# Patient Record
(demographics unavailable — no encounter records)

---

## 2024-10-21 NOTE — END OF VISIT
[FreeTextEntry3] : I, Walker Nunez solely acted as scribe for Dr. Radha Shah on 10/15/2024  All medical entries made by the Scribe were at my, Dr. Jones, direction and personally dictated by me on 10/15/2024 . I have reviewed the chart and agree that the record accurately reflects my personal performance of the history, physical exam, assessment and plan. I have also personally directed, reviewed, and agreed with the chart.

## 2024-10-21 NOTE — HISTORY OF PRESENT ILLNESS
[LMP unknown] : LMP unknown [IUD] : has an intrauterine device [Y] : Positive pregnancy history [unknown] : Patient is unsure of the date of her LMP [Menarche Age: ____] : age at menarche was [unfilled] [No] : Patient does not have concerns regarding sex [Currently Active] : currently active [Men] : men [FreeTextEntry1] : RENEE 48 yo  LMP unknown is here today for a Mirena IUD removal & reinsertion.  [Mammogramdate] : 7/18/2024 [TextBox_19] : BR 0 [BreastSonogramDate] : 7/22/2024 [TextBox_25] : BR 2 [PapSmeardate] : 8/30/2024 [TextBox_31] : negative [HPVDate] : 8/30/2024 [TextBox_78] : negative [de-identified] : Mirena [PGHxTotal] : 3 [[facility] : 4 [[facility]] : 4

## 2024-10-21 NOTE — HISTORY OF PRESENT ILLNESS
[LMP unknown] : LMP unknown [IUD] : has an intrauterine device [Y] : Positive pregnancy history [unknown] : Patient is unsure of the date of her LMP [Menarche Age: ____] : age at menarche was [unfilled] [No] : Patient does not have concerns regarding sex [Currently Active] : currently active [Men] : men [FreeTextEntry1] : RENEE 48 yo  LMP unknown is here today for a Mirena IUD removal & reinsertion.  [Mammogramdate] : 7/18/2024 [TextBox_19] : BR 0 [BreastSonogramDate] : 7/22/2024 [TextBox_25] : BR 2 [PapSmeardate] : 8/30/2024 [TextBox_31] : negative [HPVDate] : 8/30/2024 [TextBox_78] : negative [de-identified] : Mirena [PGHxTotal] : 3 [[facility]] : 4 [[facility]] : 4

## 2025-01-27 NOTE — PLAN
[FreeTextEntry1] : Sono results reviewed and the Mirena IUD is situated in the endometrial cavity. No further imaging recommended.   F/u annually or as needed.

## 2025-01-27 NOTE — HISTORY OF PRESENT ILLNESS
[N] : Patient reports normal menses [IUD] : has an intrauterine device [Menarche Age: ____] : age at menarche was [unfilled] [No] : Patient does not have concerns regarding sex [Y] : Patient is sexually active [Currently Active] : currently active [Men] : men [Mammogramdate] : 07/18/24 [TextBox_19] : BR0 [BreastSonogramDate] : 07/22/24 [TextBox_25] : LT BR2 [PapSmeardate] : 08/30/24 [TextBox_31] : NEG [BoneDensityDate] : n/a [ColonoscopyDate] : n/a [TextBox_43] : Percy: 2024 wnl per pt  [HPVDate] : 08/30/24 [TextBox_78] : NEG [LMPDate] : 01/13/2025 [de-identified] : MIRENA-10/15/2024 [PGHxTotal] : 3 [[facility]] : 2 [PGxPremature] : 1 [[facility]exLiving] : 4 [FreeTextEntry1] : 01/13/2025

## 2025-01-27 NOTE — END OF VISIT
[FreeTextEntry3] : I, Walker Nunez solely acted as scribe for Dr. Radha Shah on 01/27/2025  All medical entries made by the Scribe were at my, Dr. Jones, direction and personally dictated by me on 01/27/2025 . I have reviewed the chart and agree that the record accurately reflects my personal performance of the history, physical exam, assessment and plan. I have also personally directed, reviewed, and agreed with the chart.
20